# Patient Record
Sex: FEMALE | Race: WHITE | NOT HISPANIC OR LATINO | Employment: FULL TIME | URBAN - METROPOLITAN AREA
[De-identification: names, ages, dates, MRNs, and addresses within clinical notes are randomized per-mention and may not be internally consistent; named-entity substitution may affect disease eponyms.]

---

## 2021-07-14 ENCOUNTER — EVALUATION (OUTPATIENT)
Dept: PHYSICAL THERAPY | Facility: CLINIC | Age: 26
End: 2021-07-14
Payer: COMMERCIAL

## 2021-07-14 DIAGNOSIS — M54.42 ACUTE BILATERAL LOW BACK PAIN WITH LEFT-SIDED SCIATICA: Primary | ICD-10-CM

## 2021-07-14 PROCEDURE — 97535 SELF CARE MNGMENT TRAINING: CPT | Performed by: PHYSICAL THERAPIST

## 2021-07-14 PROCEDURE — 97162 PT EVAL MOD COMPLEX 30 MIN: CPT | Performed by: PHYSICAL THERAPIST

## 2021-07-14 RX ORDER — NAPROXEN 375 MG/1
375 TABLET ORAL 2 TIMES DAILY WITH MEALS
COMMUNITY
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
## 2021-07-19 ENCOUNTER — OFFICE VISIT (OUTPATIENT)
Dept: PHYSICAL THERAPY | Facility: CLINIC | Age: 26
End: 2021-07-19
Payer: COMMERCIAL

## 2021-07-19 DIAGNOSIS — M54.42 ACUTE BILATERAL LOW BACK PAIN WITH LEFT-SIDED SCIATICA: Primary | ICD-10-CM

## 2021-07-19 PROCEDURE — 97112 NEUROMUSCULAR REEDUCATION: CPT | Performed by: PHYSICAL THERAPIST

## 2021-07-19 PROCEDURE — 97110 THERAPEUTIC EXERCISES: CPT | Performed by: PHYSICAL THERAPIST

## 2021-07-19 NOTE — PROGRESS NOTES
Daily Note     Today's date: 2021  Patient name: Mehran Fernandez  : 1995  MRN: [id number]  Referring provider: Rosario Latham MD  Dx:   Encounter Diagnosis     ICD-10-CM    1  Acute bilateral low back pain with left-sided sciatica  M54 42        Start Time: 1630  Stop Time: 1715  Total time in clinic (min): 45 minutes    Subjective: Pt reports some "pulling" in the left posterior thigh when performing flexion exercises during yoga and a "pinching" in the low back with hip flexion B/L  No difficulty reported with HEP  Objective: See treatment diary below      Assessment: Initiated TE program focusing on core and scapular stabilization  Tolerated treatment well  Occasional cuing required for proper exercise technique  No pain reported with exercises  Advised to continue with initial HEP  Progress as tolerated  Patient would benefit from continued PT      Plan: Continue per plan of care           Precautions N/A       Manuals 21      Supine Retraction Extension  Performed        L/S OP Performed                        Neuro Re-Ed         C Retract         C Retract Ext        T Ext         Wall [place]         TB B/L ER at [place]   20x Green TB       St Shoulder 3-way at [facility]  20x ea 2#                      Ther Ex        [facility]  L3 10'      TB T & Y  20x ea Green      Prone I, T, Y  20x ea B/L       S/L Bridge   20x B/L       Clamshell   20x B/L BlueTB      Prone Hip Ext   20x B/L       Side Plank        Bird Dog   10x B/L      Dead Bug w/ PB   10x B/L                       HEP Provided        Ther Activity                        Gait Training                        Modalities        MHP/CP Prn

## 2021-07-21 ENCOUNTER — OFFICE VISIT (OUTPATIENT)
Dept: PHYSICAL THERAPY | Facility: CLINIC | Age: 26
End: 2021-07-21
Payer: COMMERCIAL

## 2021-07-21 DIAGNOSIS — M54.42 ACUTE BILATERAL LOW BACK PAIN WITH LEFT-SIDED SCIATICA: Primary | ICD-10-CM

## 2021-07-21 PROCEDURE — 97110 THERAPEUTIC EXERCISES: CPT | Performed by: PHYSICAL THERAPIST

## 2021-07-21 NOTE — PROGRESS NOTES
Daily Note     Today's date: 2021  Patient name: Iva Ybarra  : 1995  MRN: [id number]  Referring provider: Dave Serna MD  Dx:   Encounter Diagnosis     ICD-10-CM    1  Acute bilateral low back pain with left-sided sciatica  M54 42        Start Time: 1705  Stop Time: 1750  Total time in clinic (min): 45 minutes    Subjective: Pt reports some soreness in the low back yesterday following PT on Monday  Decreased with prone extension and activity t/o the day  Feels some improvement in neck and low back symptoms at this point  Objective: See treatment diary below      Assessment: Tolerated treatment well  Progressed POC focusing on core stability and strength  No pain reported with exercises  Occasional cuing required for proper exercise technique  Progress as tolerated  Patient would benefit from continued PT      Plan: Continue per plan of care           Precautions N/A       Manuals 21     Supine Retraction Extension  Performed        L/S OP Performed                        Neuro Re-Ed         C Retract         C Retract Ext        T Ext         Wall [place]    10x      TB B/L ER at [place]   20x Green TB  20x Green TB      St Shoulder 3-way at [employer] [place]  20x ea 2#                      Ther Ex        [facility]  L3 10' L3 10'     TB T & Y  20x ea Green 20x ea Green      TB Anti Rotation    20x B/L   Green      TB Chop/Lift in [place]   10x ea B/L   Green      Prone I, T, Y  20x ea B/L  20x ea B/L      S/L Bridge   20x B/L  20x B/L      Clamshell   20x B/L BlueTB 20x B/L BlueTB     Prone Hip Ext   20x B/L  20x B/L      Side Plank   10x B/L w/ Hip Abd      Bird Dog   10x B/L 10x B/L      Dead Bug w/ PB   10x B/L  10x B/L      Farmer's Carry    NV    DB Deadlift     NV                    HEP Provided        Ther Activity                        Gait Training                        Modalities        MHP/CP Prn   Declined

## 2021-07-26 ENCOUNTER — OFFICE VISIT (OUTPATIENT)
Dept: PHYSICAL THERAPY | Facility: CLINIC | Age: 26
End: 2021-07-26
Payer: COMMERCIAL

## 2021-07-26 DIAGNOSIS — M54.42 ACUTE BILATERAL LOW BACK PAIN WITH LEFT-SIDED SCIATICA: Primary | ICD-10-CM

## 2021-07-26 PROCEDURE — 97110 THERAPEUTIC EXERCISES: CPT | Performed by: PHYSICAL THERAPIST

## 2021-07-26 NOTE — PROGRESS NOTES
Daily Note     Today's date: 2021  Patient name: Tutu Grimes  : 1995  MRN: [id number]  Referring provider: Edmar Spencer MD  Dx:   Encounter Diagnosis     ICD-10-CM    1  Acute bilateral low back pain with left-sided sciatica  M54 42        Start Time: 1650  Stop Time: 1740  Total time in clinic (min): 50 minutes    Subjective: Pt reports improvement in symptoms in the cervical and lumbar region  Notes less soreness in the cervical region when performing desk work and less stiffness in the low back when waking up  Objective: See treatment diary below      Assessment: Tolerated treatment well  Progressed POC focusing on scapular and core stabilization  No pain reported with exercises per pt  Advised to continue with HEP  Progress as tolerated  Patient would benefit from continued PT      Plan: Continue per plan of care           Precautions N/A       Manuals 21    Supine Retraction Extension  Performed        L/S OP Performed                        Neuro Re-Ed         C Retract         C Retract Ext        T Ext         Wall [place]    10x      TB B/L ER at [place]   20x Green TB  20x Green TB  20x Blue TB     St Shoulder 3-way at [place] [place]  20x ea 2#                      Ther Ex        [facility]  L3 10' L3 10' L3 10' Random    TB T & Y  20x ea Green 20x ea Green  20x ea Gren    TB Anti Rotation    20x B/L   Green  20x B/L   Green     TB Chop/Lift in [place]   10x ea B/L   Green  10x ea B/L  Green     Prone I, T, Y  20x ea B/L  20x ea B/L  20x ea B/L     S/L Bridge   20x B/L  20x B/L  20x B/L    Clamshell   20x B/L BlueTB 20x B/L BlueTB 20x B/L BlueTB    Prone Hip Ext   20x B/L  20x B/L  20x B/L     Side Plank   10x B/L w/ Hip Abd  15x B/L w/ Hip Abd     Bird Dog   10x B/L 10x B/L  15x B/L     Dead Bug w/ PB   10x B/L  10x B/L  15x B/L     Farmer's Carry w/ March    2x 20' w/ 10# DBs     DB Deadlift     2x10 20#     Walking Lunge w/ [place] Carry                 HEP Provided        Ther Activity                        Gait Training                        Modalities        MHP/CP Prn   Declined

## 2021-07-28 ENCOUNTER — OFFICE VISIT (OUTPATIENT)
Dept: PHYSICAL THERAPY | Facility: CLINIC | Age: 26
End: 2021-07-28
Payer: COMMERCIAL

## 2021-07-28 DIAGNOSIS — M54.42 ACUTE BILATERAL LOW BACK PAIN WITH LEFT-SIDED SCIATICA: Primary | ICD-10-CM

## 2021-07-28 PROCEDURE — 97110 THERAPEUTIC EXERCISES: CPT | Performed by: PHYSICAL THERAPIST

## 2021-07-28 NOTE — PROGRESS NOTES
Daily Note     Today's date: 2021  Patient name: Beny Price  : 1995  MRN: [id number]  Referring provider: Ivette Frost MD  Dx:   Encounter Diagnosis     ICD-10-CM    1  Acute bilateral low back pain with left-sided sciatica  M54 42        Start Time: 1700  Stop Time: 1740  Total time in clinic (min): 40 minutes    Subjective: Pt reports she tried HIIT class last night and had some discomfort in the low back with supine flutter and scissor kicks  No other exercise lead to symptoms in the low back  Significant improvement noted overall since [facility]  Objective: See treatment diary below      Assessment: Tolerated treatment well  Updated and reviewed HEP focusing on core stability and strength  Pt demonstrated good exercise technique  No pain reported with exercises during this visit  Pt to contact clinic in 1-2 wks if additional PT services are required  Patient would benefit from continued PT      Plan: Continue per plan of care           Precautions N/A       Manuals 21   Supine Retraction Extension  Performed        L/S OP Performed                        Neuro Re-Ed         C Retract         C Retract Ext        T Ext         Wall [place]    10x      TB B/L ER at [place]   20x Green TB  20x Green TB  20x Blue TB     St Shoulder 3-way at [facility] [place]  20x ea 2#                      Ther Ex        [facility]  L3 10' L3 10' L3 10' Random L3 10'    TB T & Y  20x ea Green 20x ea Green  20x ea Green 20x ea Green   TB Anti Rotation    20x B/L   Green  20x B/L   Green  20x B/L  Green    TB Chop/Lift in [place]   10x ea B/L   Green  10x ea B/L  Green  10x w/ yellow MB   Prone I, T, Y  20x ea B/L  20x ea B/L  20x ea B/L     S/L Bridge   20x B/L  20x B/L  20x B/L 20x B/L   Clamshell   20x B/L BlueTB 20x B/L BlueTB 20x B/L BlueTB    Prone Hip Ext   20x B/L  20x B/L  20x B/L  2x10 w/ panl   Side Plank   10x B/L w/ Hip Abd  15x B/L w/ Hip Abd  15x B/L w/ Hip Abd    Bird Dog   10x B/L 10x B/L 15x B/L  15x B/L   Dead Bug w/ PB   10x B/L  10x B/L  15x B/L  15x B/L   Farmer's Carry w/ [place]    2x 20' w/ 10# DBs  2x20' w/ 10# DBs   DB Deadlift     2x10 20#  2x10 20#   Walking Lunge w/ OH Carry     2x 20' 5# DBs 2x 20' 5# DBs   Goblet Squat     2x10 20#           HEP Provided     Updated    Ther Activity                        Gait Training                        Modalities        MHP/CP Prn   Declined
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
## 2022-09-16 ENCOUNTER — OFFICE VISIT (OUTPATIENT)
Dept: URGENT CARE | Facility: CLINIC | Age: 27
End: 2022-09-16
Payer: COMMERCIAL

## 2022-09-16 VITALS
HEART RATE: 92 BPM | HEIGHT: 61 IN | BODY MASS INDEX: 22.28 KG/M2 | TEMPERATURE: 98.2 F | WEIGHT: 118 LBS | RESPIRATION RATE: 16 BRPM | OXYGEN SATURATION: 99 %

## 2022-09-16 DIAGNOSIS — S29.011A MUSCLE STRAIN OF ANTERIOR CHEST WALL: Primary | ICD-10-CM

## 2022-09-16 PROCEDURE — 99203 OFFICE O/P NEW LOW 30 MIN: CPT | Performed by: PHYSICIAN ASSISTANT

## 2022-09-16 RX ORDER — NAPROXEN 500 MG/1
500 TABLET ORAL 2 TIMES DAILY WITH MEALS
Qty: 30 TABLET | Refills: 1 | Status: SHIPPED | OUTPATIENT
Start: 2022-09-16

## 2022-09-16 RX ORDER — LEVONORGESTREL AND ETHINYL ESTRADIOL 0.1-0.02MG
1 KIT ORAL DAILY
COMMUNITY

## 2022-09-16 NOTE — PROGRESS NOTES
[email or web address]        NAME: Khai Lopez is a 28 y o  female  : 1995    MRN: [id number]  DATE: 2022  TIME: 12:45 PM    Assessment and Plan   Muscle strain of anterior chest wall [S29 011A]  1  Muscle strain of anterior chest wall  naproxen (Naprosyn) 500 mg tablet     - Follow up with PCP if symptoms do not improve   - Ice  Naproxen     Patient Instructions       Follow up with PCP in 3-5 days  Proceed to  ER if symptoms worsen  Chief Complaint     Chief Complaint   Patient presents with    Rib Pain     Right rib pain that started last night  Started while working out  Felt like pulled something  No pain meds taken  History of Present Illness       Patient is a 29 yo female who presents with a cc of of pain in her right side since yesterday  Started experiencing pain after doing a workout  States she feels like she pulled something  Has not taken anything for her symptoms  Denies SOB  Review of Systems   Review of Systems   Constitutional: Negative for fever  Respiratory: Negative for shortness of breath  Cardiovascular: Negative for chest pain  Genitourinary: Negative for flank pain  Musculoskeletal: Positive for arthralgias  Pain under rib    Neurological: Negative for numbness           Current Medications       Current Outpatient Medications:     levonorgestrel-ethinyl estradiol (AVIANE,ALESSE,LESSINA) 0 1-20 MG-MCG per tablet, Take 1 tablet by mouth daily, Disp: , Rfl:     naproxen (Naprosyn) 500 mg tablet, Take 1 tablet (500 mg total) by mouth 2 (two) times a day with meals, Disp: 30 tablet, Rfl: 1    naproxen (NAPROSYN) 375 mg tablet, Take 375 mg by mouth 2 (two) times a day with meals (Patient not taking: Reported on 2022), Disp: , Rfl:     Current Allergies     Allergies as of 2022 - Reviewed 2022   Allergen Reaction Noted    Cephalosporins Other (See Comments) 2021    Penicillins Hives 2021    Sulfa antibiotics Other (See Comments) 07/14/2021            The following portions of the patient's history were reviewed and updated as appropriate: allergies, current medications, past family history, past medical history, past social history, past surgical history and problem list      No past medical history on file  No past surgical history on file  No family history on file  Medications have been verified  Objective   Pulse 92   Temp 98 2 °F (36 8 °C)   Resp 16   Ht 5' 1" (1 549 m)   Wt 53 5 kg (118 lb)   SpO2 99%   BMI 22 30 kg/m²        Physical Exam     Physical Exam  Constitutional:       General: She is not in acute distress  Appearance: She is not toxic-appearing  Cardiovascular:      Rate and Rhythm: Normal rate  Pulmonary:      Effort: Pulmonary effort is normal  No respiratory distress  Breath sounds: Normal breath sounds  Musculoskeletal:      Comments: No tenderness to palpation of right ribs or side  Some pain noted with twisting of torso but ROM intact    Skin:     General: Skin is warm and dry  Findings: No bruising  Neurological:      Mental Status: She is alert